# Patient Record
Sex: MALE | Race: WHITE | NOT HISPANIC OR LATINO | Employment: OTHER | URBAN - METROPOLITAN AREA
[De-identification: names, ages, dates, MRNs, and addresses within clinical notes are randomized per-mention and may not be internally consistent; named-entity substitution may affect disease eponyms.]

---

## 2023-09-27 ENCOUNTER — TELEPHONE (OUTPATIENT)
Dept: CARDIOLOGY | Facility: CLINIC | Age: 63
End: 2023-09-27

## 2023-09-27 NOTE — TELEPHONE ENCOUNTER
Caller: GRACIELA KEARNEY    Relationship to patient: Emergency Contact    Best call back number: [phone]    Chief complaint: FATIGUE    Type of visit: NEW PATIENT     Requested date: ASAP     If rescheduling, when is the original appointment: 10/11/23     Additional notes:PATIENT WIFE CALLED IN TO SEE IF WE CAN GET PATIENT IN SOONER. PATIENT SAW DR. TURNER HIS CARDIOLOGIST YESTERDAY 09/26/23 AND WAS ADVISED TO CALL IN TO SEE IF WE CAN GET THE PATIENT IN SOONER.

## 2023-10-11 ENCOUNTER — OFFICE VISIT (OUTPATIENT)
Dept: CARDIOLOGY | Facility: CLINIC | Age: 63
End: 2023-10-11
Payer: COMMERCIAL

## 2023-10-11 VITALS
BODY MASS INDEX: 37.69 KG/M2 | HEIGHT: 71 IN | SYSTOLIC BLOOD PRESSURE: 162 MMHG | DIASTOLIC BLOOD PRESSURE: 92 MMHG | WEIGHT: 269.2 LBS | HEART RATE: 103 BPM | OXYGEN SATURATION: 96 %

## 2023-10-11 DIAGNOSIS — I48.4 ATYPICAL ATRIAL FLUTTER: ICD-10-CM

## 2023-10-11 DIAGNOSIS — I48.19 ATRIAL FIBRILLATION, PERSISTENT: Primary | ICD-10-CM

## 2023-10-11 DIAGNOSIS — I10 PRIMARY HYPERTENSION: ICD-10-CM

## 2023-10-11 DIAGNOSIS — G47.33 OSA ON CPAP: ICD-10-CM

## 2023-10-11 DIAGNOSIS — I49.5 TACHY-BRADY SYNDROME: ICD-10-CM

## 2023-10-11 RX ORDER — FUROSEMIDE 40 MG/1
40 TABLET ORAL AS NEEDED
COMMUNITY

## 2023-10-11 RX ORDER — FERROUS SULFATE 325(65) MG
325 TABLET ORAL
COMMUNITY

## 2023-10-11 RX ORDER — FLECAINIDE ACETATE 50 MG/1
50 TABLET ORAL EVERY 12 HOURS
COMMUNITY

## 2023-10-11 RX ORDER — ALPRAZOLAM 0.5 MG/1
0.5 TABLET ORAL 2 TIMES DAILY PRN
COMMUNITY

## 2023-10-11 RX ORDER — DILTIAZEM HYDROCHLORIDE 120 MG/1
120 CAPSULE, COATED, EXTENDED RELEASE ORAL 2 TIMES DAILY
COMMUNITY
Start: 2023-06-20

## 2023-10-11 RX ORDER — ASPIRIN 81 MG/1
81 TABLET ORAL DAILY
COMMUNITY

## 2023-10-11 RX ORDER — ATORVASTATIN CALCIUM 40 MG/1
40 TABLET, FILM COATED ORAL DAILY
COMMUNITY

## 2023-10-11 RX ORDER — DIGOXIN 125 MCG
125 TABLET ORAL DAILY
COMMUNITY
Start: 2023-09-01

## 2023-10-11 RX ORDER — FLUOXETINE HYDROCHLORIDE 20 MG/1
20 CAPSULE ORAL DAILY
COMMUNITY

## 2023-10-11 NOTE — PROGRESS NOTES
Electrophysiology Clinic Consult     Luigi Urbina  1960  [unfilled]  [unfilled]    10/11/23    DATE OF ADMISSION: (Not on file)  [facility] CARDIOLOGY    Marie Anderson R, DO  [address] / [place] [address]  Referring Provider: Ann Montalvo MD     Chief Complaint   Patient presents with    Atrial Flutter     Problem List:  Atrial Fibrillation/Atrial Flutter:   Lexiscan Cardiolite stress test 10/10/2019: EF 48%, no ischemia  Echocardiogram 3/31/2021: EF greater than 60%, mild MR mild TR, mild AR  Myocardial perfusion stress test 4/1/2021: No ischemia EF 52%  Echocardiogram 2/2/2022: EF 60 to 65%, mild MR  Failure of Flecainide, QTc prolongation with Tikosyn   PVI 8/10/2022: All pulmonary veins isolated, there was inducible typical and atypical atrial flutter with additional ablation including CTI linear ablation, lateral MV isthmus ablation, left atrial roof linear ablation  Lexiscan Stress Test 9/17/2022: EF 48%, no ischemia   Stress Echocardiogram 9/17/2022: EF 55-60%, mild to moderate mitral annular calcification, mild MR, mild TR  C 3/5/2023: normal coronary arteries   PVI and RFA of roof line and lateral MV isthmus ablation 3/1/3023 - Dr. Montalvo  48 Hour Holter 4/5/2023: Predominant NSR, 9% AFIB.   EPS with RFA of LA roof and floor line ablation, focal LA roof ablation, focal septal RA ablation, vein of marshal alcohol ablation - 4/10/2023, Dr. Montalvo     Tachy-Nhan Syndrome:   [employer] PM Implant 4/13/2023, Dr. Montalvo  CVA 5/7/2023- due to bilateral occlusion or posterior cerebral arteries, followed by neurology   MRI head 5/2023: chronic micro hemorrhage R cerebellum   Anxiety/Depression  Migraines  Sleep Apnea - on CPAP   Surgical History:  Eye surgery  Nerve exploration repair  Tonsillectomy    History of Present Illness:   Luigi Urbina is a 63 year old male with above history who presents today in consultation, referred by Dr. Montalvo for atrial fibrillation and  atrial flutter. He was diagnosed with atrial fibrillation April 2019 by his PCP. His symptoms included fatigue, low energy.   He was treated with Flecainide and subsequently Tikosyn, but failed both. He has undergone 3 ablations with Dr. Montalvo 8/2022, 3/2/1023, and 4/10/2023. He states he never maintained NSR for very long after each ablation, he has been cardioverted several times in the ED. He has a PM implanted 4/13/2023, just days after his last ablation. He states his HRs were in the 30s. He is now in atrial flutter persistently, with HRs mostly in the low 100s. He feels severely fatigue, low energy and barely functions. He states he cannot even spend time with his grandchildren or drive. He basically sits all day. He had a CVA 5/7/2023 the day after ECV. He has been compliant with Xarelto and ASA, and after his CVA, Liptor was added. Neurology recommended possible Watchman Device. He has BERNADINE and wears his CPAP most nights. Of note, he has had syncope recently in the middle of the night when he got up to urinate.   Tobacco: none  ETOH: none  Caffeine: 2 pepsi per day   Stimulants: none        No Known Allergies     Cannot display prior to admission medications because the patient has not been admitted in this contact.              Current Outpatient Medications:     ALPRAZolam (XANAX) 0.5 MG tablet, Take 1 tablet by mouth 2 (Two) Times a Day As Needed for Anxiety., Disp: , Rfl:     aspirin 81 MG EC tablet, Take 1 tablet by mouth Daily., Disp: , Rfl:     atorvastatin (LIPITOR) 40 MG tablet, Take 1 tablet by mouth Daily., Disp: , Rfl:     digoxin (LANOXIN) 125 MCG tablet, Take 1 tablet by mouth Daily., Disp: , Rfl:     dilTIAZem CD (CARDIZEM CD) 120 MG 24 hr capsule, Take 1 capsule by mouth 2 (Two) Times a Day., Disp: , Rfl:     ferrous sulfate 325 (65 FE) MG tablet, Take 1 tablet by mouth Daily With Breakfast., Disp: , Rfl:     flecainide (TAMBOCOR) 50 MG tablet, Take 1 tablet by mouth Every 12 (Twelve) Hours.,  Disp: , Rfl:     FLUoxetine (PROzac) 20 MG capsule, Take 1 capsule by mouth Daily., Disp: , Rfl:     Fremanezumab-vfrm 225 MG/1.5ML solution auto-injector, Inject 225 mg under the skin into the appropriate area as directed Every 30 (Thirty) Days., Disp: , Rfl:     furosemide (LASIX) 40 MG tablet, Take 1 tablet by mouth As Needed., Disp: , Rfl:     metFORMIN (GLUCOPHAGE) 500 MG tablet, Take 1 tablet by mouth Daily., Disp: , Rfl:     rivaroxaban (XARELTO) 20 MG tablet, Take 1 tablet by mouth Daily., Disp: , Rfl:     ubrogepant (UBRELVY) 100 MG tablet, Take 1 tablet by mouth., Disp: , Rfl:     Social History     Socioeconomic History    Marital status:    Tobacco Use    Smoking status: Never    Smokeless tobacco: Never   Vaping Use    Vaping Use: Never used   Substance and Sexual Activity    Alcohol use: Not Currently    Drug use: Never    Sexual activity: Defer       Family History: Mother:  from [place]  Father:     REVIEW OF SYSTEMS:   CONST:  No weight loss, fever, chills, weakness + fatigue. + daytime drowsiness   HEENT:  No visual loss, blurred vision, double vision, yellow sclerae.                   No hearing loss, congestion, sore throat.   SKIN:      No rashes, urticaria, ulcers, sores.     RESP:     No shortness of breath, hemoptysis, cough, sputum.   GI:           No anorexia, nausea, vomiting, diarrhea. No abdominal pain, melena.   :         No burning on urination, hematuria or increased frequency.  ENDO:    No diaphoresis, cold or heat intolerance. No polyuria or polydipsia.   NEURO:  No headache, + dizziness, +syncope, - paralysis, ataxia, or parasthesias. + unsteady gait                  No change in bowel or bladder control. No history of CVA/TIA  MUSC:    No muscle, back pain, joint pain or stiffness.   HEME:    No anemia, bleeding, bruising. No history of DVT/PE.  PSYCH:  No history of depression, anxiety    Vitals:    10/11/23 1359   BP: 162/92   BP Location: Right arm   Patient  "Position: Sitting   Pulse: 103   SpO2: 96%   Weight: 122 kg (269 lb 3.2 oz)   Height: 180.3 cm (71\")                 Physical Exam:  GEN: Well nourished, well-developed, no acute distress  HEENT: Normocephalic, atraumatic, PERRLA, moist mucous membranes  NECK: Supple, NO JVD, no thyromegaly, no lymphadenopathy  CARD: Irregular regular rate and rhythm normal S1-S2.  No murmurs appreciated.  LUNGS: Clear to auscultation, normal respiratory effort  ABDOMEN: Soft, nontender, normal bowel sounds  EXTREMITIES: No gross deformities, no clubbing, cyanosis, or edema  SKIN: Warm, dry  NEURO: No focal deficits, alert and oriented x 3  PSYCHIATRIC: Normal affect and mood      I personally viewed and interpreted the patient's EKG/Telemetry/lab data    Lab Results   Component Value Date    CALCIUM 8.9 07/18/2023     07/18/2023    K 3.8 07/18/2023    CO2 27 07/18/2023     07/18/2023    BUN 16 07/18/2023    CREATININE 1.1 07/18/2023    BCR 15 07/18/2023    ANIONGAP 9 07/18/2023     Lab Results   Component Value Date    WBC 11.9 (H) 07/18/2023    HGB 11.2 (L) 07/18/2023    HCT 34.6 (L) 07/18/2023    MCV 86.1 07/18/2023     07/18/2023     No results found for: \"INR\", \"PROTIME\"  No results found for: \"TSH\", \"Q0TEWNP\", \"R7IWHZM\", \"THYROIDAB\"    PM Manual Interrogation: normal function. RV paced 67%. 7.9 years on battery. 100% atrial flutter         ECG 12 Lead    Date/Time: 10/11/2023 3:16 PM  Performed by: Dino Abernathy MD    Authorized by: Dino Abernathy MD  Comparison: not compared with previous ECG   Previous ECG: no previous ECG available  Rhythm: atrial flutter  BPM: 100            ICD-10-CM ICD-9-CM   1. Atrial fibrillation, persistent  I48.19 427.31   2. Atypical atrial flutter  I48.4 427.32   3. Primary hypertension  I10 401.9   4. BERNADINE on CPAP  G47.33 327.23   5. Tachy-kim syndrome  I49.5 427.81       Assessment and Plan:   Persistent Atrial Fibrillation/Atypical Atrial Flutter:   - s/p PVA " 8/2022, redo PVA 3/2023, and redo ablation including RFA of LA roof and floor line ablation, focal LA roof ablation, focal septal RA ablation, vein of marshal alcohol ablation 4/2023 with Dr. Montalvo. Now in persistent atypical atrial flutter, highly symptomatic with fatigue, low energy.   -Options are extremely limited in this patient who has failed multiple antiarrhythmic medications both pre and post ablations.  Patient remains extremely symptomatic in nature from his atrial arrhythmias.  Will plan for redo pulmonary vein ablation and RFA of atypical atrial flutter. The risks, benefits, and alternatives of the procedure have been reviewed and the patient wishes to proceed.     - CHADSVasc = 4 on Xarelto - will NOT stop Xarelto prior to procedure due to history of CVA 5/2023.   - consider Watchman Device at a later date due to history of CVA while on Xarelto.       2. HTN:  - controlled    3. BERNADINE:  - on CPAP     4. Tachy-Nhan Syndrome:   - PM interrogation today is normal.        Scribed for Dino Abernathy MD by Alisa Stein PA-C. 10/11/2023  15:24 EDT    I, Dino Abernathy MD, personally performed the services described in this documentation as scribed by the above named individual in my presence, and it is both accurate and complete.  10/11/2023  15:43 EDT

## 2023-11-09 ENCOUNTER — PREP FOR SURGERY (OUTPATIENT)
Dept: OTHER | Facility: HOSPITAL | Age: 63
End: 2023-11-09
Payer: COMMERCIAL

## 2023-11-09 DIAGNOSIS — I48.4 ATYPICAL ATRIAL FLUTTER: ICD-10-CM

## 2023-11-09 DIAGNOSIS — I48.19 ATRIAL FIBRILLATION, PERSISTENT: Primary | ICD-10-CM

## 2023-11-09 RX ORDER — SODIUM CHLORIDE 0.9 % (FLUSH) 0.9 %
10 SYRINGE (ML) INJECTION AS NEEDED
OUTPATIENT
Start: 2023-11-09

## 2023-11-09 RX ORDER — SODIUM CHLORIDE 9 MG/ML
1 INJECTION, SOLUTION INTRAVENOUS CONTINUOUS
OUTPATIENT
Start: 2023-11-09 | End: 2023-11-09

## 2023-11-09 RX ORDER — NITROGLYCERIN 0.4 MG/1
0.4 TABLET SUBLINGUAL
OUTPATIENT
Start: 2023-11-09

## 2023-11-09 RX ORDER — SODIUM CHLORIDE 0.9 % (FLUSH) 0.9 %
3 SYRINGE (ML) INJECTION EVERY 12 HOURS SCHEDULED
OUTPATIENT
Start: 2023-11-09

## 2023-11-09 RX ORDER — SODIUM CHLORIDE 9 MG/ML
40 INJECTION, SOLUTION INTRAVENOUS AS NEEDED
OUTPATIENT
Start: 2023-11-09

## 2023-11-09 RX ORDER — ACETAMINOPHEN 325 MG/1
650 TABLET ORAL EVERY 4 HOURS PRN
OUTPATIENT
Start: 2023-11-09

## 2023-11-09 RX ORDER — CEFAZOLIN SODIUM 2 G/100ML
2000 INJECTION, SOLUTION INTRAVENOUS ONCE
OUTPATIENT
Start: 2023-11-09 | End: 2023-11-09